# Patient Record
Sex: FEMALE | URBAN - METROPOLITAN AREA
[De-identification: names, ages, dates, MRNs, and addresses within clinical notes are randomized per-mention and may not be internally consistent; named-entity substitution may affect disease eponyms.]

---

## 2019-01-29 ENCOUNTER — OFFICE VISIT (OUTPATIENT)
Dept: URBAN - METROPOLITAN AREA CLINIC 76 | Facility: CLINIC | Age: 79
End: 2019-01-29
Payer: MEDICARE

## 2019-01-29 DIAGNOSIS — H25.13 AGE-RELATED NUCLEAR CATARACT, BILATERAL: Primary | ICD-10-CM

## 2019-01-29 DIAGNOSIS — H35.032 HYPERTENSIVE RETINOPATHY, LEFT EYE: ICD-10-CM

## 2019-01-29 DIAGNOSIS — H52.4 PRESBYOPIA: ICD-10-CM

## 2019-01-29 PROCEDURE — 92310 CONTACT LENS FITTING OU: CPT | Performed by: OPTOMETRIST

## 2019-01-29 PROCEDURE — 92004 COMPRE OPH EXAM NEW PT 1/>: CPT | Performed by: OPTOMETRIST

## 2019-01-29 ASSESSMENT — INTRAOCULAR PRESSURE
OD: 21
OS: 21

## 2019-01-29 ASSESSMENT — KERATOMETRY
OS: 42.50
OD: 42.63

## 2019-01-29 ASSESSMENT — VISUAL ACUITY
OS: 20/30+
OD: 20/25

## 2019-01-29 NOTE — IMPRESSION/PLAN
Impression: Hypertensive retinopathy, left eye: H35.032. OS > OD. pt reports h/x of hypertension. Pt threw away medication few mos ago, it was always 120/70-something, so I threw it out. OS bcva is 20/30 today. Plan: Discussed condition. Recommend pt establish care with PCP for physical w/ blood work to rule out diabetes and cardiovascular disease. Emphasized importance of tight control of BP.

## 2019-01-29 NOTE — IMPRESSION/PLAN
Impression: Presbyopia: H52.4. OU. Pt wears mono va ctls OD: N, OS: D. Plan: Discussed diagnosis with patient. Dispensed new MRx today and ctl rx today. Pt to call with any concerns.